# Patient Record
Sex: MALE | URBAN - METROPOLITAN AREA
[De-identification: names, ages, dates, MRNs, and addresses within clinical notes are randomized per-mention and may not be internally consistent; named-entity substitution may affect disease eponyms.]

---

## 2020-10-08 ENCOUNTER — HOSPITAL ENCOUNTER (OUTPATIENT)
Dept: LAB | Facility: MEDICAL CENTER | Age: 55
End: 2020-10-08
Attending: FAMILY MEDICINE
Payer: COMMERCIAL

## 2020-10-08 LAB
COVID ORDER STATUS COVID19: NORMAL
SARS-COV-2 RNA RESP QL NAA+PROBE: NOTDETECTED
SPECIMEN SOURCE: NORMAL

## 2020-10-08 PROCEDURE — C9803 HOPD COVID-19 SPEC COLLECT: HCPCS

## 2020-10-08 PROCEDURE — U0003 INFECTIOUS AGENT DETECTION BY NUCLEIC ACID (DNA OR RNA); SEVERE ACUTE RESPIRATORY SYNDROME CORONAVIRUS 2 (SARS-COV-2) (CORONAVIRUS DISEASE [COVID-19]), AMPLIFIED PROBE TECHNIQUE, MAKING USE OF HIGH THROUGHPUT TECHNOLOGIES AS DESCRIBED BY CMS-2020-01-R: HCPCS

## 2020-11-06 ENCOUNTER — SLEEP CENTER VISIT (OUTPATIENT)
Dept: SLEEP MEDICINE | Facility: MEDICAL CENTER | Age: 55
End: 2020-11-06
Payer: COMMERCIAL

## 2020-11-06 VITALS
DIASTOLIC BLOOD PRESSURE: 84 MMHG | RESPIRATION RATE: 14 BRPM | SYSTOLIC BLOOD PRESSURE: 130 MMHG | BODY MASS INDEX: 27.09 KG/M2 | HEIGHT: 72 IN | HEART RATE: 82 BPM | OXYGEN SATURATION: 97 % | WEIGHT: 200 LBS

## 2020-11-06 DIAGNOSIS — G47.33 OSA (OBSTRUCTIVE SLEEP APNEA): ICD-10-CM

## 2020-11-06 DIAGNOSIS — F51.04 CHRONIC INSOMNIA: ICD-10-CM

## 2020-11-06 PROCEDURE — 99204 OFFICE O/P NEW MOD 45 MIN: CPT | Performed by: FAMILY MEDICINE

## 2020-11-06 RX ORDER — ATORVASTATIN CALCIUM 20 MG/1
TABLET, FILM COATED ORAL DAILY
COMMUNITY
Start: 2020-09-14

## 2020-11-06 RX ORDER — AMLODIPINE BESYLATE 5 MG/1
TABLET ORAL DAILY
COMMUNITY
Start: 2020-09-14

## 2020-11-06 NOTE — PROGRESS NOTES
"     [facility]  Consult Note     Date: 11/6/2020 / Time: 3:18 PM    Patient ID:   Name:             Helio Parekh   YOB: 1965  Age:                 55 y.o.  male   MRN:               [id number]      Thank you for requesting a sleep medicine consultation on Helio Parekh at the sleep center. He presents today with the chief complaints of trouble falling/staying asleep, snoring and non restful sleep. He is referred by Dr. Meng for evaluation and treatment of sleep disorder breathing.     HISTORY OF PRESENT ILLNESS:       He doesn't have set sleep schedule due to the work. At night,  Helio Parekh goes to bed around 10 pm-12 am He gets out of bed at 6-8 am on his day off.  He  Averages about 6-8 hrs of sleep on a good night and 2-4 hrs on a bad night. Pt has bad nights couple nights per month. He falls asleep within 60 minutes. He wakes up about 2-3 times during the night due to bathroom use and on average It takes him few min to fall back asleep. He is aware of snoring/breathing pauses/gasping or choking in sleep.  He  denies any symptoms of restless legs syndrome such as an \"urge to move\"  He  legs in the evening or bedtime. He  denies any symptoms of narcolepsy such as sleep paralysis or cataplexy, or any symptoms to suggest parasomnias such as sleep walking or acting out of dreams. He  has not used any medications for the sleep problem.    Overall, he doesnot finds his sleep refreshing.  In terms of  excessive daytime sleepiness,  He occasionally has  sleepiness while  at work, while reading or watching TV and while driving. He does not take regular naps.    REVIEW OF SYSTEMS:       Constitutional: Denies fevers, Denies weight changes  Eyes: Denies changes in vision, no eye pain  Ears/Nose/Throat/Mouth: Denies nasal congestion or sore throat   Cardiovascular: Denies chest pain or palpitations   Respiratory: Denies shortness of breath , Denies cough  Gastrointestinal/Hepatic: " Denies abdominal pain, nausea, vomiting, diarrhea, constipation or GI bleeding   Genitourinary: Denies bladder dysfunction, dysuria or frequency  Musculoskeletal/Rheum: Denies  joint pain and swelling   Skin/Breast: Denies rash  Neurological: Denies headache, confusion, memory loss or focal weakness/parasthesias  Psychiatric: denies mood disorder     Comprehensive review of systems form is reviewed with the patient and is attached in the EMR.     PMH:  has no past medical history on file.  MEDS: No current outpatient medications on file.  ALLERGIES: No Known Allergies  SURGHX: No past surgical history on file.  SOCHX:  ..   FH: No family history on file.    Physical Exam:  Vitals/ General Appearance:   Weight/BMI: Body mass index is 27.12 kg/m².  Resp 14   Ht 1.829 m (6')   Wt 90.7 kg (200 lb)   Vitals:    11/06/20 1518   Resp: 14   Weight: 90.7 kg (200 lb)   Height: 1.829 m (6')           Constitutional: Alert, no distress, well-groomed.  Skin: No rashes in visible areas.  Eye: Round. Conjunctiva clear, lids normal. No icterus.   ENMT: Lips pink without lesions, good dentition, moist mucous membranes. Phonation normal.  Neck: No masses, no thyromegaly. Moves freely without pain.  CV: Pulse as reported by patient  Respiratory: Unlabored respiratory effort, no cough or audible wheeze  Psych: Alert and oriented x3, normal affect and mood.   INVESTIGATIONS:       ASSESSMENT AND PLAN     1. He  has symptoms of Obstructive Sleep Apnea (GRETA).      We have discussed diagnostic options including in-laboratory, attended polysomnography and home sleep testing. We have also discussed treatment options including airway pressurization, reconstructive otolaryngologic surgery, dental appliances and weight management.       Subsequently,treatment options will be discussed based on the diagnostic study. Meanwhile, He is urged to avoid supine sleep, weight gain and alcoholic beverages since all of these can worsen GRETA. He is  cautioned against drowsy driving. If He feels sleepy while driving, He must pull over for a break/nap, rather than persist on the road, in the interest of He own safety and that of others on the road.    Plan  -  He  will be scheduled for an overnight PSG  to assess sleep related  breathing disorder.    2. Chronic insomnia: it is likely secondary to the GRETA and shift work disorderThe importance of cognitive restructuring and behavior modification therapy is emphasized for long-term improvement rather than the use of hypnotic agents, which may offer short term relief but may lead to the development of tolerance and side effects with prolonged use. Evening exercise, wind-down before bedtime, and stimulus control (leaving the bed when unable to fall back asleep at night) are explained.      Plan   -Handouts on stimulus control and sleep hygiene are given and a couple of titles of books on insomnia are recommended, written by behavioral psychologists.    - reassess after the treatment of GRETA    3. Regarding treatment of other past medical problems and general health maintenance,  He is urged to follow up with PCP.

## 2020-12-09 ENCOUNTER — SLEEP STUDY (OUTPATIENT)
Dept: SLEEP MEDICINE | Facility: MEDICAL CENTER | Age: 55
End: 2020-12-09
Attending: FAMILY MEDICINE
Payer: COMMERCIAL

## 2020-12-09 DIAGNOSIS — G47.33 OSA (OBSTRUCTIVE SLEEP APNEA): ICD-10-CM

## 2020-12-09 DIAGNOSIS — F51.04 CHRONIC INSOMNIA: ICD-10-CM

## 2020-12-10 PROCEDURE — 95811 POLYSOM 6/>YRS CPAP 4/> PARM: CPT | Performed by: FAMILY MEDICINE

## 2020-12-10 NOTE — PROCEDURES
Physician:   Patient: Helio Parekh  ID: [id number] Date: 12/9/2020 Exam No.:       Technical summary: The patient underwent a split-night polysomnogram. This was a 16 channel montage study to include a 6 channel EEG, a 2 channel EOG, and chin EMG, left and right leg EMG, a snore channel, a nasal pressure transducer, and a nasal oral airflow  thermistor and a CFLOW pressure transducer.   Respiratory effort was assessed with the use of a thoracic and abdominal monitor and overnight oximetry was obtained. Audio and video recordings were reviewed. This was a fully attended study and sleep stage scoring was performed. The test was technically adequate.    Scoring Criteria: A modification of the the AASM Manual for the Scoring of Sleep and Associated Events, 2012, was used.   Obstructive apnea was scored by cessation of airflow for at least 10 seconds with continuing respiratory effort.  Central apnea was scored by cessation of airflow for at least 10 seconds with no effort.  Hypopnea was scored by a 30% or more reduction in airflow for at least 10 seconds accompanied by an arterial oxygen desaturation of 3% or more.  (For Medicare patients, hypopneas were scored by a 30% or more reduction in airflow for at least 10 seconds accompanied by an arterial oxygen saturation of 4% or more, as required by their insurance, CMS.  Interpretation:  Study start time was 10:03:11 PM. Total recording time was 4h 13.0m (253 minutes) with a total sleep time of 2h 46.0m (166 minutes) resulting in a sleep efficiency of 65.61%.  Sleep latency from the start fo the study was 00 minutes minutes and REM latency from sleep onset was 167 minutes minutes.  Respiratory:   There were 21 apneas in total consisting of 21 obstructive apneas, 0 mixed apneas, and 0 central apneas. There were 54 hypopneas in total.  The apnea index was 7.59 per hour and the hypopnea index was 19.52 per hour.  The overall AHI was 27.1, with a REM AHI of 20.00, and a supine  AHI of 48.14.  Limb Movements:  There were a total of 170 periodic leg movements, of which 27 were PLMS arousals. This resulted in a PLMS index of 61.4 and a PLMS arousal index of 9.8  Oximetry:  The mean SaO2 was 93.0% for the diagnostic portion of the study, with a minimum SaO2 of 83.0%.   Treatment:  Interpretation:  Treatment recording time was 3h 45.5m (225 minutes) with a total sleep time of 3h 8.0m (188 minutes) resulting in a sleep efficiency of 83.4%.   Sleep latency from the start of treatment was 06 minutes minutes and REM latency from sleep onset was 0h 17.5m minutes.   The patient had 109 arousals in total for an arousal index of 34.8.  Respiratory:   There were 3 apneas in total consisting of 1 obstructive apneas, 0 central apneas, and 2 mixed apneas for an apnea index of 0.96.   The patient had 35 hypopneas in total, which resulted in a hypopnea index of 11.17.   The overall AHI was 12.13, with a REM AHI of 12.00, and a supine AHI of 9.00.   Limb Movements:  There were a total of 135 periodic leg movements, of which 23 were PLMS arousals. This resulted in a PLMS index of 43.1 and a PLMS arousal index of 7.3.  Oximetry:  The mean SaO2 during treatment was 92.0%, with a minimum oxygen saturation of 86.0%.    CPAP Titration:  Due to the significant number of obstructive respiratory events observed during the diagnostic portion of the study a CPAP titration trial was performed during the second half of the night. The CPAP pressure was initiated at 5 cm of water and the pressure was increased in an attempt to eliminate all sleep disordered breathing and snoring. The CPAP pressure was increased to 12 cm water and at this final pressure the patient was observed in the supine position and in the REM sleep stage. The apnea hypopnea index improved to 5.9/hr with improved O2 bebo of  89%.He spent 3 min of sleep time below 89% O2 saturation Snoring was resolved.  The patient utilized medium f30 mask with heated  humidification. The CPAP was well-tolerated and there was minimal air leaks. No supplemental oxygen was required.    Impression:  1.  Moderate obstructive sleep apnea with AHI of 27.1/hr and O2 bebo 83 %. Due to severity of the disease he met the split study protocol. The titration started with CPAP 5 cm and the best tolerated was CPAP 12 cm. The AHI improved to 5.9/hr with improved O2 bebo of 89% and average O2 saturation of 93 %.         Recommendations:  I recommend auto CPAP 12-16 cm with f30 mask. I also recommend 30 day compliance download to assess the efficacy to the recommended pressure, measure leak, apnea hypopnea index and compliance for further outpatient monitoring and management of CPAP therapy. In some cases alternative treatment options may prove effective in resolving sleep apnea and these options include upper airway surgery, the use of a dental orthotic or weight loss and positional therapy. Clinical correlation is required. In general patients with sleep apnea are advised to avoid alcohol and sedatives and to not operate a motor vehicle while drowsy and are at a greater risk for cardiovascular disease.

## 2021-01-13 ENCOUNTER — OFFICE VISIT (OUTPATIENT)
Dept: SLEEP MEDICINE | Facility: MEDICAL CENTER | Age: 56
End: 2021-01-13
Payer: COMMERCIAL

## 2021-01-13 VITALS
HEART RATE: 74 BPM | BODY MASS INDEX: 26.55 KG/M2 | HEIGHT: 72 IN | WEIGHT: 196 LBS | SYSTOLIC BLOOD PRESSURE: 120 MMHG | DIASTOLIC BLOOD PRESSURE: 82 MMHG | OXYGEN SATURATION: 97 % | RESPIRATION RATE: 16 BRPM

## 2021-01-13 DIAGNOSIS — G47.33 OSA (OBSTRUCTIVE SLEEP APNEA): ICD-10-CM

## 2021-01-13 DIAGNOSIS — I10 ESSENTIAL HYPERTENSION: ICD-10-CM

## 2021-01-13 PROCEDURE — 99214 OFFICE O/P EST MOD 30 MIN: CPT | Performed by: INTERNAL MEDICINE

## 2021-01-13 NOTE — PROGRESS NOTES
CC: PSG results      HPI:  Helio Parekh is a 55 y.o.male  kindly referred by Dr. Adam Wbeb MD for evaluation of sleep onset and maintenance insomnia, snoring, and nonrestorative sleep.  When first seen on 11/6/2020 he complained of snoring,/breathing pauses/gasping or choking during sleep with nocturia 2-3 times.    His 12/9/2020 PSG showed an AHI of 27.1/bebo saturation 83%.  His CPAP titration was incomplete but he did best with CPAP at 12 cm water with an AHI 5.9/bebo saturation 89%/average saturation 93%.  The titration with CPAP at 12 cm water included supine REM sleep.  The interpreting physician, Dr. Cisneros, recommended APAP 12-16 cm water.  I agree that that would be the best initial pressure settings.    Patient Active Problem List    Diagnosis Date Noted   • GRETA (obstructive sleep apnea) 01/13/2021   • Essential hypertension 01/13/2021       Past Medical History:   Diagnosis Date   • Chickenpox         History reviewed. No pertinent surgical history.    History reviewed. No pertinent family history.    Social History     Socioeconomic History   • Marital status: Unknown     Spouse name: Not on file   • Number of children: Not on file   • Years of education: Not on file   • Highest education level: Not on file   Occupational History   • Not on file   Social Needs   • Financial resource strain: Not on file   • Food insecurity     Worry: Not on file     Inability: Not on file   • Transportation needs     Medical: Not on file     Non-medical: Not on file   Tobacco Use   • Smoking status: Never Smoker   • Smokeless tobacco: Never Used   Substance and Sexual Activity   • Alcohol use: Not Currently     Comment: occ   • Drug use: Never   • Sexual activity: Not on file   Lifestyle   • Physical activity     Days per week: Not on file     Minutes per session: Not on file   • Stress: Not on file   Relationships   • Social connections     Talks on phone: Not on file     Gets together: Not on file     Attends  "Mandaeism service: Not on file     Active member of club or organization: Not on file     Attends meetings of clubs or organizations: Not on file     Relationship status: Not on file   • Intimate partner violence     Fear of current or ex partner: Not on file     Emotionally abused: Not on file     Physically abused: Not on file     Forced sexual activity: Not on file   Other Topics Concern   • Not on file   Social History Narrative   • Not on file       Current Outpatient Medications   Medication Sig Dispense Refill   • atorvastatin (LIPITOR) 20 MG Tab Take  by mouth every day. Take 1 tablet by mouth every day     • amLODIPine (NORVASC) 5 MG Tab Take  by mouth every day. Take 1 tablet by mouth daily     • PROAIR  (90 Base) MCG/ACT Aero Soln inhalation aerosol INHALE 2 PUFFS PO Q 4 H PRF COUGH / SOB       No current facility-administered medications for this visit.     \"CURRENT RX\"    ALLERGIES: Patient has no known allergies.    ROS    Constitutional: Denies fever, chills, sweats,  weight loss, fatigue  Cardiovascular: Denies chest pain, tightness, palpitations, swelling in legs/feet, fainting, difficulty breathing when lying down but gets better when sitting up.   Respiratory: Denies shortness of breath, cough, sputum, wheezing, painful breathing, coughing up blood.   Sleep: per HPI  Gastrointestinal: Denies  difficulty swallowing, nausea, abdominal pain, diarrhea, constipation, heartburn.  Musculoskeletal: Denies painful joints, sore muscles, back pain.        PHYSICAL EXAM  Excellent weight    /82 (BP Location: Left arm, Patient Position: Sitting, BP Cuff Size: Adult)   Pulse 74   Resp 16   Ht 1.829 m (6')   Wt 88.9 kg (196 lb)   SpO2 97%   BMI 26.58 kg/m²   Appearance: Well-nourished, well-developed, no acute distress  Eyes:  PERRLA, EOMI  ENMT: masked  Neck: Supple, trachea midline, no masses  Respiratory effort:  No intercostal retractions or use of accessory muscles  Lung auscultation:  No " wheezes rhonchi rubs or rales  Cardiac: No murmurs, rubs, or gallops; regular rhythm, normal rate; no edema  Abdomen:  No tenderness, no organomegaly.  Musculoskeletal:  Grossly normal; gait and station normal; digits and nails normal  Skin:  No rashes, petechiae, cyanosis  Neurologic: without focal signs; oriented to person, time, place, and purpose; judgement intact  Psychiatric:  No depression, anxiety, agitation      Medical Decision Making       The medical record was reviewed in its entirety including the referral notes, records from primary care, consultants notes, hospital records, lab, imaging, microbiology, immunology, and immunizations.  Care gaps identified and reviewed with the patient.    Diagnostic and titration nocturnal polysomnogram's, home sleep apnea tests, continuous nocturnal oximetry results, multiple sleep latency tests, and compliance reports reviewed.  1. GRETA (obstructive sleep apnea)    2. Essential hypertension    Other orders  - DME CPAP      PLAN:   His 12/9/2020 PSG showed an AHI of 27.1/bebo saturation 83%.  His CPAP titration was incomplete but he did best with CPAP at 12 cm water with an AHI 5.9/bebo saturation 89%/average saturation 93%.  The titration with CPAP at 12 cm water included supine REM sleep.  The interpreting physician, Dr. Cisneros, recommended APAP 12-16 cm water.    We will initiate treatment with auto titrating CPAP 12-16 cm water using a medium air fit F 30 fullface mask or mask of choice to fit and heated humidification followed by clinical and compliance review 31-90 days after he receives his equipment.      The risks of untreated sleep apnea were discussed with the patient at length. Patients with GRETA are at increased risk of cardiovascular disease including coronary artery disease, systemic arterial hypertension, pulmonary arterial hypertension, cardiac arrythmias, and stroke. GRETA patients have an increased risk of motor vehicle accidents, type 2 diabetes,  chronic kidney disease, and non-alcoholic liver disease. The patient was advised to avoid driving a motor vehicle when drowsy.    Positive airway pressure will favorably impact many of the adverse conditions and effects provoked by GRETA.    Have advised the patient to follow up with the appropriate healthcare practitioners for all other medical problems and issues.    Mask wearing, handwashing, and social distancing protocols reviewed and encouraged.    Return in about 10 weeks (around 3/24/2021).      Total time 31 minutes

## 2021-01-14 ENCOUNTER — APPOINTMENT (OUTPATIENT)
Dept: SLEEP MEDICINE | Facility: MEDICAL CENTER | Age: 56
End: 2021-01-14
Payer: COMMERCIAL